# Patient Record
Sex: FEMALE | Race: WHITE
[De-identification: names, ages, dates, MRNs, and addresses within clinical notes are randomized per-mention and may not be internally consistent; named-entity substitution may affect disease eponyms.]

---

## 2017-04-25 ENCOUNTER — HOSPITAL ENCOUNTER (EMERGENCY)
Dept: HOSPITAL 44 - ED | Age: 27
Discharge: HOME | End: 2017-04-25
Payer: COMMERCIAL

## 2017-04-25 VITALS — SYSTOLIC BLOOD PRESSURE: 153 MMHG | DIASTOLIC BLOOD PRESSURE: 94 MMHG

## 2017-04-25 DIAGNOSIS — K02.9: Primary | ICD-10-CM

## 2017-04-25 PROCEDURE — 99283 EMERGENCY DEPT VISIT LOW MDM: CPT

## 2017-04-25 PROCEDURE — 96372 THER/PROPH/DIAG INJ SC/IM: CPT

## 2018-04-25 ENCOUNTER — HOSPITAL ENCOUNTER (EMERGENCY)
Dept: HOSPITAL 44 - ED | Age: 28
Discharge: HOME | End: 2018-04-25
Payer: COMMERCIAL

## 2018-04-25 VITALS — SYSTOLIC BLOOD PRESSURE: 138 MMHG | DIASTOLIC BLOOD PRESSURE: 70 MMHG

## 2018-04-25 DIAGNOSIS — J02.0: Primary | ICD-10-CM

## 2018-04-25 LAB
BASOPHILS NFR BLD: 0.4 % (ref 0–1.5)
EGFR (AFRICAN): > 60
EGFR (NON-AFRICAN): > 60
EOSINOPHIL NFR BLD: 2.3 % (ref 0–6.8)
MCH RBC QN AUTO: 29.4 PG (ref 28–34)
MCV RBC AUTO: 84.9 FL (ref 80–100)
MONOCYTES %: 2.9 % (ref 0–11)
NEUTROPHILS #: 12.2 # K/UL (ref 1.4–7.7)

## 2018-04-25 PROCEDURE — 96372 THER/PROPH/DIAG INJ SC/IM: CPT

## 2018-04-25 PROCEDURE — 87880 STREP A ASSAY W/OPTIC: CPT

## 2018-04-25 PROCEDURE — 99283 EMERGENCY DEPT VISIT LOW MDM: CPT

## 2018-04-25 PROCEDURE — 80053 COMPREHEN METABOLIC PANEL: CPT

## 2018-04-25 PROCEDURE — 85025 COMPLETE CBC W/AUTO DIFF WBC: CPT

## 2018-04-25 NOTE — ED PHYSICIAN DOCUMENTATION
Sore Throat/Dental Pain





- HISTORIAN


Historian: patient





- HPI


Stated Complaint: sore throat


Chief Complaint: Sore Throat


Onset: hours (8)


Context: Possible Infection


Associated Symptoms: sore throat, moderate, unable to swallow


Worsened By: nothing


Further Comments: yes (She states symptoms started this am with sore throat and 

half way through work she notes she could hardly swallow. Denies a fever. She 

did take Ibuprofen with some water at lunch. She is able to swallow her 

secretions. No other complaints)





- ROS


CONST: no problems





- PAST HX


Past History: none


Other History: none


Immunizations: UTD


Allergies/Adverse Reactions: 


 Allergies











Allergy/AdvReac Type Severity Reaction Status Date / Time


 


bees Allergy Intermediate  Uncoded 04/25/18 14:07














Home Medications: 


 Ambulatory Orders











 Medication  Instructions  Recorded


 


NK [NK]  04/25/18














- SOCIAL HX


Smoking History: non-smoker


Alcohol Use: none


Drug Use: none





- FAMILY HX


Family History: No





- VITAL SIGNS


Vital Signs: 


 Vital Signs











Temp Pulse Resp BP Pulse Ox


 


 97.3 F L  103 H  17   138/70   96 


 


 04/25/18 14:19  04/25/18 14:19  04/25/18 14:19  04/25/18 14:19  04/25/18 14:19














- REVIEWED ASSESSMENTS


Nursing Assessment  Reviewed: Yes


Vitals Reviewed: Yes





ED Results Lab/Radiology





- Lab Results


Lab Results: 


 Lab Results











  04/25/18 04/25/18 04/25/18





  14:01 14:01 14:00


 


WBC    14.30 K/ul H K/ul  





    (4.00-12.00)  


 


RBC    4.87 M/ul M/ul  





    (3.90-5.20)  


 


Hgb    14.3 g/dL g/dL  





    (12.0-16.0)  


 


Hct    41.4 % %  





    (34.5-46.5)  


 


MCV    84.9 fl fl  





    (80.0-100.0)  


 


MCH    29.4 pg pg  





    (28.0-34.0)  


 


MCHC    34.6 g/dL g/dL  





    (30.0-36.0)  


 


RDW    12.8 % %  





    (11.3-14.3)  


 


Plt Count    320 K/mm3 K/mm3  





    (130-400)  


 


Neut % (Auto)    85.5 % H %  





    (39.0-79.0)  


 


Lymph % (Auto)    8.5 % L %  





    (16.0-50.0)  


 


Mono % (Auto)    2.9 % %  





    (0.0-11.0)  


 


Eos % (Auto)    2.3 % %  





    (0.0-6.8)  


 


Baso % (Auto)    0.4   





    (0.0-1.5)  


 


Neut # (Auto)    12.2 # k/uL H # k/uL  





    (1.4-7.7)  


 


Lymph # (Auto)    1.2 # k/uL # k/uL  





    (0.6-4.0)  


 


Mono # (Auto)    0.4 # k/uL # k/uL  





    (0.0-0.9)  


 


Eos # (Auto)    0.3 # k/uL # k/uL  





    (0.0-0.6)  


 


Baso # (Auto)    0.0 # k/uL # k/uL  





    (0.0-0.5)  


 


Reactive Lymphs %    0.5 % %  





    (0.0-5.0)  


 


Reactive Lymphs #    0.1 # k/uL # k/uL  





    (0.0-0.8)  


 


Sodium  143 mmol/L mmol/L    





   (136-145)   


 


Potassium  3.7 mmol/L mmol/L    





   (3.5-5.1)   


 


Chloride  103 mmol/L mmol/L    





   ()   


 


Carbon Dioxide  24 mmol/L mmol/L    





   (22-30)   


 


BUN  5 mg/dL L mg/dL    





   (7-17)   


 


Creatinine  0.70 mg/dL mg/dL    





   (0.52-1.04)   


 


Estimated Creat Clear  201     





    


 


Est GFR ( Amer)  > 60     





  (60 - )  


 


Est GFR (Non-Af Amer)  > 60     





  (60 - )  


 


Glucose  94 mg/dL mg/dL    





   ()   


 


Calcium  9.4 mg/dL mg/dL    





   (8.4-10.2)   


 


Total Bilirubin  1.1 mg/dL mg/dL    





   (0.2-1.3)   


 


AST  15 U/L U/L    





   (15-46)   


 


ALT  20 U/L U/L    





   (13-69)   


 


Alkaline Phosphatase  67 U/L U/L    





   ()   


 


Total Protein  7.7 g/dL g/dL    





   (6.3-8.2)   


 


Albumin  4.5 g/dL g/dL    





   (3.5-5.0)   


 


Group A Strep Screen      Positive  H 





     (NEGATIVE) 














- Orders


Orders: 


 ED Orders











 Category Date Time Status


 


 CBC/PLATELET/DIFF Stat Lab  04/25/18 14:01 Completed


 


 CMP Stat Lab  04/25/18 14:01 Completed


 


 GRP A STREP SCREEN Stat Lab  04/25/18 14:00 Completed


 


 Penicillin G Benzathine [Bicillin l-A] Med  04/25/18 14:04 Discontinued





 1,200,000 units IM NOW ONE   


 


 methylPREDNISolone ACETATE [Depo-Medrol] Med  04/25/18 14:05 Discontinued





 80 mg IM NOW ONE   














Sore throat Physical Exam





- EXAM


General Appearance: no acute distress, alert


Head/Neck: head nml inspection, trachea midline, no lymphadenopathy, thyroid nml


Eyes: eyes nml inspection, PERRL


Mouth/Throat: lips nml, gums nml, no drooling, no air way problems, pharyngeal 

erythema, tonsillar exudate.  No: muffled voice


Ear/Nose: nml inspection, TM erythema


Respiratory: no resp. distress, breath sounds nml, respiratory distress


CVS: reg. rate & rhythm, heart sounds nml


Abdomen: soft, normal bowel sounds


Extremities: non-tender, nml ROM


Skin: warm/dry, normal color


Neuro/Psych: oriented x3, mood/affect nml





Discharge


Clincal Impression: 


 Strep pharyngitis





Referrals: 


Krystina Davidson MD [Primary Care Provider] - 2 Days


Comments: 





1. Follow up with PCP in 1-2 days


2. Return for any other concerns to ER 


Condition: Stable


Disposition: 01 HOME, SELF-CARE


Decision to Admit: NO


Date of Decison to Admit: 04/25/18


Decision Time: 15:00